# Patient Record
Sex: FEMALE
[De-identification: names, ages, dates, MRNs, and addresses within clinical notes are randomized per-mention and may not be internally consistent; named-entity substitution may affect disease eponyms.]

---

## 2020-11-12 ENCOUNTER — RESULT REVIEW (OUTPATIENT)
Age: 35
End: 2020-11-12

## 2020-12-10 PROBLEM — Z00.00 ENCOUNTER FOR PREVENTIVE HEALTH EXAMINATION: Status: ACTIVE | Noted: 2020-12-10

## 2020-12-22 ENCOUNTER — APPOINTMENT (OUTPATIENT)
Dept: MATERNAL FETAL MEDICINE | Facility: CLINIC | Age: 35
End: 2020-12-22
Payer: COMMERCIAL

## 2020-12-22 PROCEDURE — 99205 OFFICE O/P NEW HI 60 MIN: CPT | Mod: 95

## 2021-01-08 RX ORDER — PNV/FERROUS SULFATE/FOLIC ACID 27-<0.5MG
TABLET ORAL
Refills: 0 | Status: ACTIVE | COMMUNITY

## 2021-01-08 NOTE — HISTORY OF PRESENT ILLNESS
[FreeTextEntry1] : Maternal-Fetal Medicine telemedicine consultation \par Patient verbally consented to a telemedicine consultation\par Ob/gyn: Dr. 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 Pregnancy was uncomplicated until duodenal atresia was suspected in the 3rd trimester, moved from [place] at ~27 weeks. Delivered a live baby girl "Aerilyn", no crying at birth, blood noted in amniotic fluid (abruption was suspected), poor  course and  on DOL#2. Aneuploidy screening, including NIPT, low-risk. No fetal autopsy (declined), no additional genetic testing on infant/. \par \par Medical and surgical history is otherwise unremarkable (except Lasix). Gynecologic history is unremarkable except for lighter menses and shorter duration of menses. Current medications include prenatal vitamins (and herbal supplements - black currant). Allergies: NKDA\par \par She is not working currently. She lives with her . She denies a history of tobacco use. She reports occasional alcohol intake. Denies drug use. She has never received blood products but is willing to receive them if needed. \par \par Family history is notable for a history of "small stroke" in her father in 60s (due to valve issue in heart that was subsequently repaired), smoker. Her mother is healthy. Older sister is healthy. She denies a family history of birth defects, mental retardation, developmental delay or genetic disorders. \par \par Height: 5'1" ~weight: 112# \par Prepregnancy BMI: 21.2

## 2021-01-08 NOTE — DATA REVIEWED
[FreeTextEntry1] : Outside records:\par Initial prenatal care in [place], transfer ~27 weeks\par First trimester screen and NIPT low-risk fetal aneuploidy\par Normal 20 week anatomy scan except for placenta previa\par Progenity "preparent" extended carrier screen and hemoglobinopathy evaluation negative\par \par Admitted to [facility] with pPROM at 33w4d, fetus with known duodenal atresia. Entered  labor and then proceeded to  due to fetal intolerance of labor. \par \par Placental path:\par Placenta 320 grams, <10th percentile for GA, marginal cord insertion with hypercoiling of cord noted. Pigment-laden macrophages, moderate hypoxic ischemic villous changes.\par \par Operative report:\par Female infant, 2030 grams. Amniotic fluid noted to be bloody. Low-transverse uterine incision, hysterotomy closed in 2-layers.

## 2021-01-08 NOTE — DISCUSSION/SUMMARY
[FreeTextEntry1] : 1. Poor obstetric history: Fetal anomaly, suspected duodenal atresia, with evidence of placental insufficiency on pathology, pPROM, suspected placental abruption, and  demise.\par \par Ms. Teixeira and her partner were reassured that the combination of events were likely all interrelated, and while her history does increase the risk of adverse pregnancy outcomes in the future the absolute risk is low and there is a high chance of a good pregnancy outcome. However, the lack of autopsy or fetal genetic testing complicates more precise counseling. We reviewed the potential option of sending placental path slides for microarray, and they are interested in pursuing this option. \par \par Due to the history of possible placental abruption we discussed the following: Women with placental abruption are at severalfold higher risk of abruption in a subsequent pregnancy. 3 to 15% of women have a recurrence, compared with a baseline incidence of 0.4 to 1.3% in the general population. After two consecutive abruptions, the risk of a third rises to 20 to 25%. The risk of recurrence is higher after a severe abruption than after a mild abruption. When the abruption is severe enough to kill the fetus, there is up to a 7% incidence of abruption with fetal demise in a future pregnancy (though the cause of Ms. Teixeira's abruption may be more related to other underlying issues in the last pregnancy with lower recurrence risk). Testing women with a history of abruption for antiphospholipid antibodies or an inherited thrombophilia is generally not indicated but given the evidence of placental insufficiency ruling out APLS is reasonable. \par \par Placental abruption, preeclampsia, and intrauterine growth restriction appear to be variable clinical manifestations of uteroplacental underperfusion, chronic hypoxia, and uteroplacental ischemia. These disorders often coexist in a pregnancy, or one may occur in one pregnancy while another occurs in a subsequent pregnancy. For this reason, in subsequent pregnancies would recommend to screen for growth restriction approximately every four weeks starting at 24 to 28 weeks and continuing until delivery. Routine periodic fetal antepartum surveillance for women with a history of placental abruption is not standard. However, would be reasonable to initiate fetal surveillance in the late third trimester (~30-32 weeks), unless indicated earlier. Would recommend MFM follow-up in future pregnancy and can determine delivery timing/planning at that time. \par \par When asked about how Ms. Teixeira is doing from an emotional standpoint, she became tearful and admitted to having a difficult time with some anxiety. She denies SI/HI. She is able to perform activities of daily living, and her partner reported that overall she seems to be doing well. She has not had any specific mental health support or guidance since her delivery. Emotional support provided.\par \par Recommendations:\par 1. Colleagues at [facility] contacted and we are currently exploring the possibility of sending placental slides for microarray (confirmed with [facility] that this is possible).\par 2. APLS testing, couple in [place] and information given regarding tests to order. They plan to follow-up with physicians in [place] for testing.\par 3. Therapy/mental health support recommended. Also reached out to  psych program at [facility] to determine if patient appropriate for services. \par \par

## 2021-01-27 DIAGNOSIS — O09.299 SUPERVISION OF PREGNANCY WITH OTHER POOR REPRODUCTIVE OR OBSTETRIC HISTORY, UNSPECIFIED TRIMESTER: ICD-10-CM

## 2021-02-13 ENCOUNTER — LABORATORY RESULT (OUTPATIENT)
Age: 36
End: 2021-02-13

## 2021-02-16 LAB
B2 GLYCOPROT1 IGG SER-ACNC: <5 SGU
B2 GLYCOPROT1 IGM SER-ACNC: 5 SMU
LUPUS ANTICOAGULANT CASCADE REFLEX: NORMAL

## 2021-02-17 LAB
CARDIOLIPIN IGM SER-MCNC: 7.7 MPL
CARDIOLIPIN IGM SER-MCNC: <5 GPL

## 2021-03-31 ENCOUNTER — RESULT REVIEW (OUTPATIENT)
Age: 36
End: 2021-03-31

## 2024-06-06 ENCOUNTER — APPOINTMENT (OUTPATIENT)
Dept: PLASTIC SURGERY | Facility: CLINIC | Age: 39
End: 2024-06-06
Payer: COMMERCIAL

## 2024-06-06 VITALS — WEIGHT: 105 LBS | BODY MASS INDEX: 19.83 KG/M2 | OXYGEN SATURATION: 98 % | HEART RATE: 87 BPM | HEIGHT: 61 IN

## 2024-06-06 DIAGNOSIS — L81.9 DISORDER OF PIGMENTATION, UNSPECIFIED: ICD-10-CM

## 2024-06-06 DIAGNOSIS — F45.8 OTHER SOMATOFORM DISORDERS: ICD-10-CM

## 2024-06-06 DIAGNOSIS — Z78.9 OTHER SPECIFIED HEALTH STATUS: ICD-10-CM

## 2024-06-06 PROCEDURE — 99499 UNLISTED E&M SERVICE: CPT

## 2024-06-13 NOTE — HISTORY OF PRESENT ILLNESS
[FreeTextEntry1] : 40 y/o presents for initial consultation to discuss regarding botox and lesions underneath her eyes and neck. Patient had botox 4 years ago for TMJ, she does not know how many units she received.   PE  Gen - NAD Bilateral masseter hypertrophy with widening of the lower third of face. Slight clicking appreciate at the TMJ Hyperactivity of frontalis appreciated with rhytids  After prepping the skin with alcohol, I marked out the areas of concern along the mandible at the masseter I then injected botox cosmetic intramuscularly into the affected areas. Cold compress was applied after injections. A total of 50 units were injected - 25units in each masseter muscle Laser treatment advised for dyschromia below eyes.